# Patient Record
Sex: MALE | Race: ASIAN | NOT HISPANIC OR LATINO | Employment: FULL TIME | URBAN - METROPOLITAN AREA
[De-identification: names, ages, dates, MRNs, and addresses within clinical notes are randomized per-mention and may not be internally consistent; named-entity substitution may affect disease eponyms.]

---

## 2024-02-06 ENCOUNTER — HOSPITAL ENCOUNTER (INPATIENT)
Facility: CLINIC | Age: 54
LOS: 3 days | Discharge: HOME | DRG: 976 | End: 2024-02-09
Attending: EMERGENCY MEDICINE | Admitting: INTERNAL MEDICINE

## 2024-02-06 ENCOUNTER — HOSPITAL ENCOUNTER (INPATIENT)
Facility: CLINIC | Age: 54
LOS: 1 days | Discharge: HOME | DRG: 866 | End: 2024-02-06
Attending: EMERGENCY MEDICINE | Admitting: EMERGENCY MEDICINE

## 2024-02-06 VITALS
OXYGEN SATURATION: 100 % | HEIGHT: 65 IN | RESPIRATION RATE: 18 BRPM | TEMPERATURE: 98.4 F | WEIGHT: 130 LBS | SYSTOLIC BLOOD PRESSURE: 139 MMHG | DIASTOLIC BLOOD PRESSURE: 85 MMHG | BODY MASS INDEX: 21.66 KG/M2 | HEART RATE: 97 BPM

## 2024-02-06 DIAGNOSIS — B02.7 DISSEMINATED HERPES ZOSTER: ICD-10-CM

## 2024-02-06 DIAGNOSIS — B02.7 DISSEMINATED ZOSTER: Primary | ICD-10-CM

## 2024-02-06 DIAGNOSIS — B02.7 DISSEMINATED ZOSTER: ICD-10-CM

## 2024-02-06 LAB
ANION GAP SERPL CALCULATED.3IONS-SCNC: 11 MMOL/L (ref 7–15)
BASOPHILS # BLD AUTO: 0 10E3/UL (ref 0–0.2)
BASOPHILS NFR BLD AUTO: 0 %
BUN SERPL-MCNC: 16.8 MG/DL (ref 6–20)
CALCIUM SERPL-MCNC: 9 MG/DL (ref 8.6–10)
CHLORIDE SERPL-SCNC: 105 MMOL/L (ref 98–107)
CREAT SERPL-MCNC: 1.07 MG/DL (ref 0.67–1.17)
DEPRECATED HCO3 PLAS-SCNC: 23 MMOL/L (ref 22–29)
EGFRCR SERPLBLD CKD-EPI 2021: 84 ML/MIN/1.73M2
EOSINOPHIL # BLD AUTO: 0.1 10E3/UL (ref 0–0.7)
EOSINOPHIL NFR BLD AUTO: 1 %
ERYTHROCYTE [DISTWIDTH] IN BLOOD BY AUTOMATED COUNT: 13.8 % (ref 10–15)
GLUCOSE SERPL-MCNC: 98 MG/DL (ref 70–99)
HCT VFR BLD AUTO: 39.5 % (ref 40–53)
HGB BLD-MCNC: 12.9 G/DL (ref 13.3–17.7)
IMM GRANULOCYTES # BLD: 0 10E3/UL
IMM GRANULOCYTES NFR BLD: 0 %
LYMPHOCYTES # BLD AUTO: 2.3 10E3/UL (ref 0.8–5.3)
LYMPHOCYTES NFR BLD AUTO: 47 %
MCH RBC QN AUTO: 28.9 PG (ref 26.5–33)
MCHC RBC AUTO-ENTMCNC: 32.7 G/DL (ref 31.5–36.5)
MCV RBC AUTO: 89 FL (ref 78–100)
MONOCYTES # BLD AUTO: 0.6 10E3/UL (ref 0–1.3)
MONOCYTES NFR BLD AUTO: 12 %
NEUTROPHILS # BLD AUTO: 2 10E3/UL (ref 1.6–8.3)
NEUTROPHILS NFR BLD AUTO: 40 %
NRBC # BLD AUTO: 0 10E3/UL
NRBC BLD AUTO-RTO: 0 /100
PLATELET # BLD AUTO: 232 10E3/UL (ref 150–450)
POTASSIUM SERPL-SCNC: 4.6 MMOL/L (ref 3.4–5.3)
RBC # BLD AUTO: 4.46 10E6/UL (ref 4.4–5.9)
SODIUM SERPL-SCNC: 139 MMOL/L (ref 135–145)
WBC # BLD AUTO: 5 10E3/UL (ref 4–11)

## 2024-02-06 PROCEDURE — 99285 EMERGENCY DEPT VISIT HI MDM: CPT | Mod: 25

## 2024-02-06 PROCEDURE — 250N000011 HC RX IP 250 OP 636: Mod: JZ | Performed by: EMERGENCY MEDICINE

## 2024-02-06 PROCEDURE — 99222 1ST HOSP IP/OBS MODERATE 55: CPT | Mod: AI | Performed by: INTERNAL MEDICINE

## 2024-02-06 PROCEDURE — 80048 BASIC METABOLIC PNL TOTAL CA: CPT | Performed by: EMERGENCY MEDICINE

## 2024-02-06 PROCEDURE — 250N000011 HC RX IP 250 OP 636: Performed by: INTERNAL MEDICINE

## 2024-02-06 PROCEDURE — 120N000001 HC R&B MED SURG/OB

## 2024-02-06 PROCEDURE — 258N000003 HC RX IP 258 OP 636: Performed by: EMERGENCY MEDICINE

## 2024-02-06 PROCEDURE — 36415 COLL VENOUS BLD VENIPUNCTURE: CPT | Performed by: EMERGENCY MEDICINE

## 2024-02-06 PROCEDURE — 87536 HIV-1 QUANT&REVRSE TRNSCRPJ: CPT | Performed by: EMERGENCY MEDICINE

## 2024-02-06 PROCEDURE — 250N000013 HC RX MED GY IP 250 OP 250 PS 637: Performed by: INTERNAL MEDICINE

## 2024-02-06 PROCEDURE — 258N000003 HC RX IP 258 OP 636: Performed by: INTERNAL MEDICINE

## 2024-02-06 PROCEDURE — 85025 COMPLETE CBC W/AUTO DIFF WBC: CPT | Performed by: EMERGENCY MEDICINE

## 2024-02-06 RX ORDER — ONDANSETRON 2 MG/ML
4 INJECTION INTRAMUSCULAR; INTRAVENOUS EVERY 6 HOURS PRN
Status: DISCONTINUED | OUTPATIENT
Start: 2024-02-06 | End: 2024-02-09 | Stop reason: HOSPADM

## 2024-02-06 RX ORDER — LIDOCAINE 40 MG/G
CREAM TOPICAL
Status: DISCONTINUED | OUTPATIENT
Start: 2024-02-06 | End: 2024-02-09 | Stop reason: HOSPADM

## 2024-02-06 RX ORDER — BISACODYL 10 MG
10 SUPPOSITORY, RECTAL RECTAL DAILY PRN
Status: DISCONTINUED | OUTPATIENT
Start: 2024-02-06 | End: 2024-02-09 | Stop reason: HOSPADM

## 2024-02-06 RX ORDER — GABAPENTIN 100 MG/1
300 CAPSULE ORAL ONCE
Status: DISCONTINUED | OUTPATIENT
Start: 2024-02-06 | End: 2024-02-06

## 2024-02-06 RX ORDER — POLYETHYLENE GLYCOL 3350 17 G/17G
17 POWDER, FOR SOLUTION ORAL 2 TIMES DAILY PRN
Status: DISCONTINUED | OUTPATIENT
Start: 2024-02-06 | End: 2024-02-09 | Stop reason: HOSPADM

## 2024-02-06 RX ORDER — GABAPENTIN 100 MG/1
100 CAPSULE ORAL 3 TIMES DAILY
Status: DISCONTINUED | OUTPATIENT
Start: 2024-02-06 | End: 2024-02-09 | Stop reason: HOSPADM

## 2024-02-06 RX ORDER — OXYCODONE HYDROCHLORIDE 5 MG/1
5 TABLET ORAL
Status: DISCONTINUED | OUTPATIENT
Start: 2024-02-06 | End: 2024-02-06

## 2024-02-06 RX ORDER — ACETAMINOPHEN 325 MG/1
975 TABLET ORAL EVERY 8 HOURS
Status: DISCONTINUED | OUTPATIENT
Start: 2024-02-06 | End: 2024-02-09 | Stop reason: HOSPADM

## 2024-02-06 RX ORDER — ONDANSETRON 4 MG/1
4 TABLET, ORALLY DISINTEGRATING ORAL EVERY 6 HOURS PRN
Status: DISCONTINUED | OUTPATIENT
Start: 2024-02-06 | End: 2024-02-09 | Stop reason: HOSPADM

## 2024-02-06 RX ORDER — HYDROMORPHONE HCL IN WATER/PF 6 MG/30 ML
0.4 PATIENT CONTROLLED ANALGESIA SYRINGE INTRAVENOUS
Status: DISCONTINUED | OUTPATIENT
Start: 2024-02-06 | End: 2024-02-09 | Stop reason: HOSPADM

## 2024-02-06 RX ORDER — HYDROMORPHONE HYDROCHLORIDE 2 MG/1
2 TABLET ORAL EVERY 4 HOURS PRN
Status: DISCONTINUED | OUTPATIENT
Start: 2024-02-06 | End: 2024-02-09 | Stop reason: HOSPADM

## 2024-02-06 RX ORDER — SODIUM CHLORIDE 9 MG/ML
INJECTION, SOLUTION INTRAVENOUS CONTINUOUS
Status: DISCONTINUED | OUTPATIENT
Start: 2024-02-06 | End: 2024-02-09

## 2024-02-06 RX ORDER — HYDROMORPHONE HCL IN WATER/PF 6 MG/30 ML
0.2 PATIENT CONTROLLED ANALGESIA SYRINGE INTRAVENOUS
Status: DISCONTINUED | OUTPATIENT
Start: 2024-02-06 | End: 2024-02-09 | Stop reason: HOSPADM

## 2024-02-06 RX ORDER — AMOXICILLIN 250 MG
2 CAPSULE ORAL 2 TIMES DAILY PRN
Status: DISCONTINUED | OUTPATIENT
Start: 2024-02-06 | End: 2024-02-09 | Stop reason: HOSPADM

## 2024-02-06 RX ORDER — CALCIUM CARBONATE 500 MG/1
1000 TABLET, CHEWABLE ORAL 4 TIMES DAILY PRN
Status: DISCONTINUED | OUTPATIENT
Start: 2024-02-06 | End: 2024-02-09 | Stop reason: HOSPADM

## 2024-02-06 RX ORDER — AMOXICILLIN 250 MG
1 CAPSULE ORAL 2 TIMES DAILY PRN
Status: DISCONTINUED | OUTPATIENT
Start: 2024-02-06 | End: 2024-02-09 | Stop reason: HOSPADM

## 2024-02-06 RX ORDER — ACETAMINOPHEN 500 MG
1000 TABLET ORAL ONCE
Status: DISCONTINUED | OUTPATIENT
Start: 2024-02-06 | End: 2024-02-06

## 2024-02-06 RX ADMIN — ACYCLOVIR SODIUM 600 MG: 50 INJECTION, SOLUTION INTRAVENOUS at 08:43

## 2024-02-06 RX ADMIN — GABAPENTIN 100 MG: 100 CAPSULE ORAL at 21:03

## 2024-02-06 RX ADMIN — ACYCLOVIR SODIUM 600 MG: 50 INJECTION, SOLUTION INTRAVENOUS at 22:56

## 2024-02-06 RX ADMIN — ACETAMINOPHEN 975 MG: 325 TABLET, FILM COATED ORAL at 21:03

## 2024-02-06 RX ADMIN — SODIUM CHLORIDE: 9 INJECTION, SOLUTION INTRAVENOUS at 22:55

## 2024-02-06 ASSESSMENT — ACTIVITIES OF DAILY LIVING (ADL)
ADLS_ACUITY_SCORE: 18
ADLS_ACUITY_SCORE: 18
ADLS_ACUITY_SCORE: 35
ADLS_ACUITY_SCORE: 35

## 2024-02-06 NOTE — ED TRIAGE NOTES
Pt was seen earlier and was told to stay overnight due to shingles. Pt declined and stated he needed to bring roommate to work. Pt states he would be back and now wants to be admitted. Pt complains of rash to neck.

## 2024-02-06 NOTE — DISCHARGE INSTRUCTIONS
"Once you have returned your car to your roommate, please have your roommate bring you back here immediately.  The findings of \"disseminated shingles\" are serious and need to be treated in the hospital with IV antiviral medicines.  "

## 2024-02-06 NOTE — ED PROVIDER NOTES
"  History     Chief Complaint:  Rash     The history is provided by the patient.      Sandi Thomas is a 53 year old male who presents with a rash. Patient reports onset of a raised, blistering rash around the right side of his neck 2 days ago. Since then, it has spread around the right side of his neck, ear, collarbone, and scalp. He states this is mildly itchy, and the blisters are draining fluid. He adds he has some rash on his forehead near his scalp, but this appears different than the other areas. Denies fever or vomiting. Also denies pain, other than some discomfort moving his neck as his skin stretches. Patient also states he got a new necklace about one week ago, which was a little itchy, but denies known metal allergies. Patient reports history of HIV, but has had viral testing with infectious disease, and is not on any medication for this. Denies use of daily medication.     Independent Historian:    None - Patient Only    Review of External Notes:  Telephone encounter 9/29/2023: Social work was trying to get the patient scheduled in infectious disease clinic.  It looks like last medications were refilled in October 2021.    Allergies:  No Known Allergies     Physical Exam   Patient Vitals for the past 24 hrs:   BP Temp Temp src Pulse Resp SpO2 Height Weight   02/06/24 0707 139/85 98.4  F (36.9  C) Oral 97 18 100 % 1.651 m (5' 5\") 59 kg (130 lb)        Physical Exam    Skin: There are notable vesicles (some with scabbing) over an erythematous base extending at least from the C4 down to the C7 dermatome (possibly up into the C3 and down to T1).  These seem to be localized to the right side of his body. No facial lesions noted.  Ears: No ear lesions noted.  Eye: No facial lesions noted. No eye pain or suggestion of ocular lesions.    Emergency Department Course     Laboratory: Imaging:   Labs Ordered and Resulted from Time of ED Arrival to Time of ED Departure   CBC WITH PLATELETS AND DIFFERENTIAL - Abnormal "       Result Value    WBC Count 5.0      RBC Count 4.46      Hemoglobin 12.9 (*)     Hematocrit 39.5 (*)     MCV 89      MCH 28.9      MCHC 32.7      RDW 13.8      Platelet Count 232      % Neutrophils 40      % Lymphocytes 47      % Monocytes 12      % Eosinophils 1      % Basophils 0      % Immature Granulocytes 0      NRBCs per 100 WBC 0      Absolute Neutrophils 2.0      Absolute Lymphocytes 2.3      Absolute Monocytes 0.6      Absolute Eosinophils 0.1      Absolute Basophils 0.0      Absolute Immature Granulocytes 0.0      Absolute NRBCs 0.0     BASIC METABOLIC PANEL - Normal    Sodium 139      Potassium 4.6      Chloride 105      Carbon Dioxide (CO2) 23      Anion Gap 11      Urea Nitrogen 16.8      Creatinine 1.07      GFR Estimate 84      Calcium 9.0      Glucose 98     HIV-1 RNA QUANTITATIVE     No orders to display           Emergency Department Course & Assessments:  Interventions:  Medications   acyclovir (ZOVIRAX) 600 mg in sodium chloride 0.9 % 112 mL intermittent infusion (0 mg Intravenous Stopped 2/6/24 1014)        Assessments, Independent Interpretation, Consult/Discussion of ManagementTests:  ED Course as of 02/06/24 1157   Tue Feb 06, 2024   0740 I evaluated the patient and obtained history as noted above.   0746 I obtained additional history from the patient.    0813 Consult with Dr. Moscoso, Infectious Disease She confirms that IV acyclovir and admission is reasonable. Would like HIV RNA (she will order other tests)   0815 Rechecked and updated patient with plan for admission.   0829 Consult with NAYANA Wynne for Dr. Greer, hospitalist. She accepted the patient for admission.    0958 Nurse informed me patient thinks he is being discharged to home and is getting upset with her. I spoke with him to reiterate plan for admission.   1157 I telephoned the patient to follow-up with him and he notes that he will be coming back in in a couple of hours.     Social Determinants of Health affecting  care:  None    Disposition:  See ED Course and MDM    Impression & Plan    CMS Diagnoses: None    Code Status: No Order    Medical Decision Making:  This 53-year-old male patient presents to the ED due to a rash.  Please see the HPI and exam for specifics.  The rash is consistent with shingles and unfortunately appears to be disseminated shingles as this covers multiple dermatomes as above in the exam.  It does not cross midline.  The patient has a history of HIV but states he is not currently on any medication.  After discussion with infectious disease, admission for IV acyclovir was recommended.  The patient was initially agreeable to this though later he told nursing he needed to leave.  The patient tells me that he drove his roommates car here and has remained needs to go to work.  I told him that I could discharge him but asked his roommate to bring him back and the patient was agreeable to this.  He will return and I told him that hospitalization will be needed for this condition.    Critical Care:  None.    Diagnosis:    ICD-10-CM    1. Disseminated zoster  B02.7 Primary Care Referral         Scribe Disclosure:  Lakeshia LOU, am serving as a scribe at 7:38 AM on 2/6/2024 to document services personally performed by Sebastien Laguerre DO based on my observations and the provider's statements to me.    2/6/2024   Sebastien Laguerre DO Burns, Bradley Joseph, DO  02/06/24 1150       Sebastien Laguerre DO  02/06/24 1158

## 2024-02-06 NOTE — ED NOTES
"[facility]  ED Nurse Handoff Report    ED Chief complaint: Rash  . ED Diagnosis:   Final diagnoses:   Disseminated zoster       Allergies: No Known Allergies    Code Status: Full Code    Activity level - Baseline/Home:  independent.  Activity Level - Current:   independent.   Lift room needed: No.   Bariatric: No   Needed: No   Isolation: Yes. Suspected shingles, disseminated.   Infection: Not Applicable  Other .     Respiratory status: Room air    Vital Signs (within 30 minutes):   Vitals:    02/06/24 0707   BP: 139/85   Pulse: 97   Resp: 18   Temp: 98.4  F (36.9  C)   TempSrc: Oral   SpO2: 100%   Weight: 59 kg (130 lb)   Height: 1.651 m (5' 5\")       Cardiac Rhythm:  ,      Pain level:    Patient confused: No.   Patient Falls Risk: patient and family education.   Elimination Status: Has voided     Patient Report - Initial Complaint: rash.   Focused Assessment: Sandi Thomas is a 53 year old male who presents with a rash. Patient reports onset of a raised, blistering rash around the right side of his neck 2 days ago. Since then, it has spread around the right side of his neck, ear, collarbone, and scalp. He states this is mildly itchy, and the blisters are draining fluid. He adds he has some rash on his forehead near his scalp, but this appears different than the other areas. Denies fever or vomiting. Also denies pain, other than some discomfort moving his neck as his skin stretches. Patient also states he got a new necklace about one week ago, which was a little itchy, but denies known metal allergies. Patient reports history of HIV, but has had viral testing with infectious disease, and is not on any medication for this. Denies use of daily medication.              Time Type Event User           0856  Skin  JJ    SkinSkin WDL: .WDL except (pustule open rash posterior neck, lateral R neck; denies fevers. Blanchable)              Abnormal Results:   Labs Ordered and Resulted from " Time of ED Arrival to Time of ED Departure   CBC WITH PLATELETS AND DIFFERENTIAL - Abnormal       Result Value    WBC Count 5.0      RBC Count 4.46      Hemoglobin 12.9 (*)     Hematocrit 39.5 (*)     MCV 89      MCH 28.9      MCHC 32.7      RDW 13.8      Platelet Count 232      % Neutrophils 40      % Lymphocytes 47      % Monocytes 12      % Eosinophils 1      % Basophils 0      % Immature Granulocytes 0      NRBCs per 100 WBC 0      Absolute Neutrophils 2.0      Absolute Lymphocytes 2.3      Absolute Monocytes 0.6      Absolute Eosinophils 0.1      Absolute Basophils 0.0      Absolute Immature Granulocytes 0.0      Absolute NRBCs 0.0     BASIC METABOLIC PANEL   HIV-1 RNA QUANTITATIVE        No orders to display       Treatments provided: labs  Family Comments: per pt.   OBS brochure/video discussed/provided to patient:  No  ED Medications:   Medications   acyclovir (ZOVIRAX) 600 mg in sodium chloride 0.9 % 112 mL intermittent infusion (600 mg Intravenous $New Bag 2/6/24 0895)       Drips infusing:  No  For the majority of the shift this patient was Green.   Interventions performed were n/a.    Sepsis treatment initiated: No    Cares/treatment/interventions/medications to be completed following ED care: none    ED Nurse Name: Rosamaria Georges RN  8:58 AM

## 2024-02-06 NOTE — ED TRIAGE NOTES
Pt here today for a rash that started on his neck and is now spreading to the back of his head and shoulder. Rash first appeared 2 days ago. Pt states it is painful, rash is raised and appears to be blistering. No known exposure that pt is aware of. Denies any other symptoms or hx chicken pox

## 2024-02-07 LAB
ALBUMIN SERPL BCG-MCNC: 3.8 G/DL (ref 3.5–5.2)
ALP SERPL-CCNC: 55 U/L (ref 40–150)
ALT SERPL W P-5'-P-CCNC: 20 U/L (ref 0–70)
ANION GAP SERPL CALCULATED.3IONS-SCNC: 7 MMOL/L (ref 7–15)
AST SERPL W P-5'-P-CCNC: 31 U/L (ref 0–45)
BASOPHILS # BLD AUTO: 0 10E3/UL (ref 0–0.2)
BASOPHILS NFR BLD AUTO: 0 %
BILIRUB SERPL-MCNC: 0.3 MG/DL
BUN SERPL-MCNC: 13.7 MG/DL (ref 6–20)
CALCIUM SERPL-MCNC: 8.8 MG/DL (ref 8.6–10)
CD3 CELLS # BLD: 1908 CELLS/UL (ref 603–2990)
CD3 CELLS NFR BLD: 91 % (ref 49–84)
CD3+CD4+ CELLS # BLD: 87 CELLS/UL (ref 441–2156)
CD3+CD4+ CELLS NFR BLD: 4 % (ref 28–63)
CD3+CD4+ CELLS/CD3+CD8+ CLL BLD: 0.05 % (ref 1.4–2.6)
CD3+CD8+ CELLS # BLD: 1798 CELLS/UL (ref 125–1312)
CD3+CD8+ CELLS NFR BLD: 85 % (ref 10–40)
CHLORIDE SERPL-SCNC: 104 MMOL/L (ref 98–107)
CREAT SERPL-MCNC: 1.06 MG/DL (ref 0.67–1.17)
DEPRECATED HCO3 PLAS-SCNC: 25 MMOL/L (ref 22–29)
EGFRCR SERPLBLD CKD-EPI 2021: 85 ML/MIN/1.73M2
EOSINOPHIL # BLD AUTO: 0.1 10E3/UL (ref 0–0.7)
EOSINOPHIL NFR BLD AUTO: 2 %
ERYTHROCYTE [DISTWIDTH] IN BLOOD BY AUTOMATED COUNT: 13.9 % (ref 10–15)
GLUCOSE SERPL-MCNC: 83 MG/DL (ref 70–99)
HCT VFR BLD AUTO: 39.5 % (ref 40–53)
HGB BLD-MCNC: 12.8 G/DL (ref 13.3–17.7)
IMM GRANULOCYTES # BLD: 0 10E3/UL
IMM GRANULOCYTES NFR BLD: 0 %
LYMPHOCYTES # BLD AUTO: 1.9 10E3/UL (ref 0.8–5.3)
LYMPHOCYTES NFR BLD AUTO: 37 %
MCH RBC QN AUTO: 29.4 PG (ref 26.5–33)
MCHC RBC AUTO-ENTMCNC: 32.4 G/DL (ref 31.5–36.5)
MCV RBC AUTO: 91 FL (ref 78–100)
MONOCYTES # BLD AUTO: 0.6 10E3/UL (ref 0–1.3)
MONOCYTES NFR BLD AUTO: 11 %
NEUTROPHILS # BLD AUTO: 2.6 10E3/UL (ref 1.6–8.3)
NEUTROPHILS NFR BLD AUTO: 50 %
NRBC # BLD AUTO: 0 10E3/UL
NRBC BLD AUTO-RTO: 0 /100
PLATELET # BLD AUTO: 205 10E3/UL (ref 150–450)
POTASSIUM SERPL-SCNC: 4 MMOL/L (ref 3.4–5.3)
PROT SERPL-MCNC: 7.6 G/DL (ref 6.4–8.3)
RBC # BLD AUTO: 4.36 10E6/UL (ref 4.4–5.9)
SODIUM SERPL-SCNC: 136 MMOL/L (ref 135–145)
T CELL COMMENT: ABNORMAL
WBC # BLD AUTO: 5.2 10E3/UL (ref 4–11)

## 2024-02-07 PROCEDURE — 86360 T CELL ABSOLUTE COUNT/RATIO: CPT | Performed by: INTERNAL MEDICINE

## 2024-02-07 PROCEDURE — 36415 COLL VENOUS BLD VENIPUNCTURE: CPT | Performed by: INTERNAL MEDICINE

## 2024-02-07 PROCEDURE — 99254 IP/OBS CNSLTJ NEW/EST MOD 60: CPT | Performed by: INTERNAL MEDICINE

## 2024-02-07 PROCEDURE — 120N000001 HC R&B MED SURG/OB

## 2024-02-07 PROCEDURE — 250N000011 HC RX IP 250 OP 636: Mod: JZ | Performed by: INTERNAL MEDICINE

## 2024-02-07 PROCEDURE — 86359 T CELLS TOTAL COUNT: CPT | Performed by: INTERNAL MEDICINE

## 2024-02-07 PROCEDURE — 85004 AUTOMATED DIFF WBC COUNT: CPT | Performed by: INTERNAL MEDICINE

## 2024-02-07 PROCEDURE — 250N000013 HC RX MED GY IP 250 OP 250 PS 637: Performed by: INTERNAL MEDICINE

## 2024-02-07 PROCEDURE — 258N000003 HC RX IP 258 OP 636: Performed by: INTERNAL MEDICINE

## 2024-02-07 PROCEDURE — 80053 COMPREHEN METABOLIC PANEL: CPT | Performed by: INTERNAL MEDICINE

## 2024-02-07 PROCEDURE — 99232 SBSQ HOSP IP/OBS MODERATE 35: CPT | Performed by: INTERNAL MEDICINE

## 2024-02-07 RX ADMIN — ACETAMINOPHEN 975 MG: 325 TABLET, FILM COATED ORAL at 19:56

## 2024-02-07 RX ADMIN — ACYCLOVIR SODIUM 600 MG: 50 INJECTION, SOLUTION INTRAVENOUS at 14:31

## 2024-02-07 RX ADMIN — SODIUM CHLORIDE: 9 INJECTION, SOLUTION INTRAVENOUS at 11:17

## 2024-02-07 RX ADMIN — GABAPENTIN 100 MG: 100 CAPSULE ORAL at 09:00

## 2024-02-07 RX ADMIN — ACYCLOVIR SODIUM 600 MG: 50 INJECTION, SOLUTION INTRAVENOUS at 22:17

## 2024-02-07 RX ADMIN — ACETAMINOPHEN 975 MG: 325 TABLET, FILM COATED ORAL at 11:25

## 2024-02-07 RX ADMIN — ACYCLOVIR SODIUM 600 MG: 50 INJECTION, SOLUTION INTRAVENOUS at 06:50

## 2024-02-07 RX ADMIN — ACETAMINOPHEN 975 MG: 325 TABLET, FILM COATED ORAL at 03:16

## 2024-02-07 RX ADMIN — GABAPENTIN 100 MG: 100 CAPSULE ORAL at 19:57

## 2024-02-07 RX ADMIN — GABAPENTIN 100 MG: 100 CAPSULE ORAL at 14:31

## 2024-02-07 RX ADMIN — SODIUM CHLORIDE: 9 INJECTION, SOLUTION INTRAVENOUS at 22:17

## 2024-02-07 RX ADMIN — BICTEGRAVIR SODIUM, EMTRICITABINE, AND TENOFOVIR ALAFENAMIDE FUMARATE 1 TABLET: 50; 200; 25 TABLET ORAL at 16:29

## 2024-02-07 ASSESSMENT — ACTIVITIES OF DAILY LIVING (ADL)
ADLS_ACUITY_SCORE: 18

## 2024-02-07 NOTE — PLAN OF CARE
ROOM # 625    Living Situation: Home w/ friend, [facility]  Facility name:  : [facility]    Activity level at baseline: ind  Activity level on admit: ind    Who will be transporting you at discharge: [facility]    Patient registered to observation; given Patient Bill of Rights; given the opportunity to ask questions about observation status and their plan of care.  Patient has been oriented to the observation room, bathroom and call light is in place.    Discussed discharge goals and expectations with patient/family.         Goal Outcome Evaluation:

## 2024-02-07 NOTE — PHARMACY-ADMISSION MEDICATION HISTORY
Pharmacy Intern Admission Medication History    Admission medication history is complete. The information provided in this note is only as accurate as the sources available at the time of the update.    Information Source(s): Patient via phone    Pertinent Information: NA    Changes made to PTA medication list:  Added: None  Deleted: None  Changed: None    Allergies reviewed with patient and updates made in EHR: yes    Medication History Completed By: Stacy Merrill 2/7/2024 11:39 AM    Prior to Admission medications    Not on File

## 2024-02-07 NOTE — CONSULTS
[facility]    Infectious Disease Consultation     Date of Admission:  2/6/2024  Date of Consult (When I saw the patient): 02/07/24    Assessment & Plan   Sandi Thomas is a 53 year old who was admitted on 2/6/2024.     Impression: 1 53-year-old male, underlying HIV, admitted with acute right chest wall and arm zoster, primarily in dermatome, no evidence of visceral infection some dissemination outside the border  2 longstanding HIV infection, last lab studies in 2018 looks like stopped medications in 2021, had some insurance issues and no follow-up with ID at [facility] since that time, T cells 87 currently viral load pending but likely to be high    REC 1 start [employer], social service to see regarding the insurance issue and if has an insurance issue seek out support options for getting medications  2 acyclovir for now, likely oral therapy acceptable relatively soon        Jluis Hernández MD    Reason for Consult   Reason for consult: I was asked to evaluate this patient for zoster.    Primary Care Physician   Physician No Ref-Primary    Chief Complaint   Right chest wall pain    History is obtained from the patient and medical records    History of Present Illness   Sandi Thomas is a 53 year old male who presents with worsening right chest wall pain with some lesions outside the dermatome, consistent with zoster and now on treatment for that.  Patient has underlying HIV going back many years.  Followed primarily in the [facility] system.  From the records appears last labs were in 2018 at which time he is well-controlled on therapy.  He kept getting refills on the prescriptions and to about 2021 then had some insurance issues, has not followed up with particular had labs since then.  Has been off treatment at least the last 2 and half years and has felt relatively well and cul this current rash.  HIV labs already obtained and T cells just came back at 87    Past Medical History   I  "have reviewed this patient's medical history and updated it with pertinent information if needed.   Past Medical History:   Diagnosis Date    HIV (human immunodeficiency virus infection) (H)        Past Surgical History   I have reviewed this patient's surgical history and updated it with pertinent information if needed.  No past surgical history on file.    Prior to Admission Medications   None     Allergies   No Known Allergies    Immunization History     There is no immunization history on file for this patient.    Social History   I have reviewed this patient's social history and updated it with pertinent information if needed. Sandi Thomas  reports that he has been smoking. He does not have any smokeless tobacco history on file. He reports current alcohol use. He reports that he does not use drugs.    Family History   I have reviewed this patient's family history and updated it with pertinent information if needed.   No family history on file.    Review of Systems   The 10 point Review of Systems is negative other than the chest wall area, some pain but not severe    Physical Exam   Temp: 97.9  F (36.6  C) Temp src: Temporal BP: 137/82 Pulse: 83   Resp: 16 SpO2: 98 % O2 Device: None (Room air)    Vital Signs with Ranges  Temp:  [97.3  F (36.3  C)-99.2  F (37.3  C)] 97.9  F (36.6  C)  Pulse:  [82-95] 83  Resp:  [16-18] 16  BP: (121-142)/(61-91) 137/82  SpO2:  [98 %-100 %] 98 %  128 lbs 9.6 oz  Body mass index is 21.4 kg/m .    GENERAL APPEARANCE:  awake  EYES: Eyes grossly normal to inspection  NECK: no adenopathy  RESP: lungs clear   CV: regular rates and rhythm  LYMPHATICS: normal ant/post cervical and supraclavicular nodes  ABDOMEN: soft, nontender  MS: extremities normal  SKIN: no suspicious lesions right chest wall and arm mostly in dermatome consistent with varicella-zoster        Data   All laboratory and imaging data in the past 24 hours reviewed  No results for input(s): \"CULT\" in the last 168 " "hours.  No lab results found.    Invalid input(s): \"UC\"       All cultures:  No results for input(s): \"CULTURE\" in the last 168 hours.   Blood culture:  No results found for this or any previous visit.   Urine culture:  No results found for this or any previous visit.          "

## 2024-02-07 NOTE — ED PROVIDER NOTES
"  History     Chief Complaint:  Rash       HPI   Sandi Thomas is a 53 year old male who presents with rash. He explains that two days ago he developed a red blistered rash over the right side of his neck and collarbone that has since spread  downward into the right arm. He was seen in the ED earlier today and was started on Acyclovir though he had to leave to return a car (per ED note earlier today). He says that the associated pain is now a 9/10 in severity. Denies cough, fever, or history of shingles. He has not taken anything for pain. Patient has a medical history significant for HIV, he explains this was diagnosed roughly twenty years ago and has not required medication.    Independent Historian:   None - Patient Only    Medications:    No current outpatient medications on file.    Past Medical History:    HIV    Past Surgical History:    No past surgical history on file.     Physical Exam   Patient Vitals for the past 24 hrs:   BP Temp Temp src Pulse Resp SpO2 Height Weight   02/06/24 2044 (!) 140/91 97.6  F (36.4  C) Oral 82 18 100 % 1.651 m (5' 5\") 58.3 kg (128 lb 9.6 oz)   02/06/24 1751 (!) 142/89 99.2  F (37.3  C) Temporal 95 17 99 % -- --        Physical Exam  VS: Reviewed per above  HENT: normal speech  EYES: sclera anicteric  CV: Rate as noted  RESP: Effort normal.   NEURO: Alert, moving all extremities  MSK: No deformity of the extremities  SKIN: Warm and dry, vesicular lesions of the right neck and right posterior shoulder/upper back and right bicep region.        Emergency Department Course         Emergency Department Course & Assessments:       Interventions:  Medications   acyclovir (ZOVIRAX) 600 mg in sodium chloride 0.9 % 112 mL intermittent infusion (has no administration in time range)   lidocaine 1 % 0.1-1 mL (has no administration in time range)   lidocaine (LMX4) cream (has no administration in time range)   sodium chloride (PF) 0.9% PF flush 3 mL (has no administration in time range) "   sodium chloride (PF) 0.9% PF flush 3 mL (has no administration in time range)   senna-docusate (SENOKOT-S/PERICOLACE) 8.6-50 MG per tablet 1 tablet (has no administration in time range)     Or   senna-docusate (SENOKOT-S/PERICOLACE) 8.6-50 MG per tablet 2 tablet (has no administration in time range)   calcium carbonate (TUMS) chewable tablet 1,000 mg (has no administration in time range)   gabapentin (NEURONTIN) capsule 100 mg (100 mg Oral $Given 2/6/24 2103)   sodium chloride 0.9 % infusion (has no administration in time range)   HYDROmorphone (DILAUDID) half-tab 1 mg (has no administration in time range)   HYDROmorphone (DILAUDID) tablet 2 mg (has no administration in time range)   HYDROmorphone (DILAUDID) injection 0.2 mg (has no administration in time range)   HYDROmorphone (DILAUDID) injection 0.4 mg (has no administration in time range)   acetaminophen (TYLENOL) tablet 975 mg (975 mg Oral $Given 2/6/24 2103)   bisacodyl (DULCOLAX) suppository 10 mg (has no administration in time range)   polyethylene glycol (MIRALAX) Packet 17 g (has no administration in time range)   ondansetron (ZOFRAN ODT) ODT tab 4 mg (has no administration in time range)     Or   ondansetron (ZOFRAN) injection 4 mg (has no administration in time range)   nicotine (NICORETTE) gum 2 mg (has no administration in time range)        Assessments:  1844 I obtained history and examined the patient as noted above.    Disposition:  The patient was admitted to the hospital under the care of Dr. Morrison.     Impression & Plan      Medical Decision Making:  Patient presents to the ER after he was seen a few hours ago here with concern for disseminated herpes zoster infection in the setting of untreated HIV.  Patient was supposed to be admitted but left AMA for other reasons.  He returns with intent to be admitted.  Basic labs were checked a few hours ago and IV acyclovir was also provided.  Infectious disease was apparently also consulted.  At this  juncture, IV was replaced and he was admitted to hospitalist service for further cares.    Diagnosis:    ICD-10-CM    1. Disseminated herpes zoster  B02.7            Discharge Medications:  There are no discharge medications for this patient.       2/6/2024   Isaak Romero MD Lindenbaum, Elan, MD  02/06/24 6670

## 2024-02-07 NOTE — ED NOTES
[facility]  ED Nurse Handoff Report    ED Chief complaint: Rash  . ED Diagnosis:   Final diagnoses:   None       Allergies: No Known Allergies    Code Status: Full Code    Activity level - Baseline/Home:  independent.  Activity Level - Current:   independent.   Lift room needed: No.   Bariatric: No   Needed: No   Isolation: Yes.   Infection: Not Applicable  HIV.     Respiratory status: Room air    Vital Signs (within 30 minutes):   Vitals:    02/06/24 1751   BP: (!) 142/89   Pulse: 95   Resp: 17   Temp: 99.2  F (37.3  C)   TempSrc: Temporal   SpO2: 99%       Cardiac Rhythm:  ,      Pain level:    Patient confused: No.   Patient Falls Risk: arm band in place and patient and family education.   Elimination Status: Has voided     Patient Report - Initial Complaint: Rash.   Focused Assessment: Rash        HPI   Sandi Thomas is a 53 year old male who presents with rash. He explains that two days ago he developed a red blistered rash over the right side of his neck and collarbone that has since spread  downward into the right arm. He was seen in the ED earlier today and was started on Acyclovir though he had to leave to return a car (per ED note earlier today). He says that the associated pain is now a 9/10 in severity. Denies cough, fever, or history of shingles. He has not taken anything for pain. Patient has a medical history significant for HIV, he explains this was diagnosed roughly twenty years ago and has not required medication.     Abnormal Results:   Labs Ordered and Resulted from Time of ED Arrival to Time of ED Departure - No data to display     No orders to display       Treatments provided: None  Family Comments: N/a  OBS brochure/video discussed/provided to patient:  N/A  ED Medications: Medications - No data to display    Drips infusing:  No  For the majority of the shift this patient was Green.   Interventions performed were None.    Sepsis treatment initiated:  No    Cares/treatment/interventions/medications to be completed following ED care: None    ED Nurse Name: Yamileth Bah RN  6:56 PM   RECEIVING UNIT ED HANDOFF REVIEW    Above ED Nurse Handoff Report was reviewed: Yes  Reviewed by: Saad Denis RN on February 6, 2024 at 8:23 PM

## 2024-02-07 NOTE — PLAN OF CARE
Goal Outcome Evaluation:      Plan of Care Reviewed With: patient    Overall Patient Progress: improving    A&O x 4. Independent with mobility. Tolerating regular diet. IV fluids and IV antiviral infused.Tylenol and gabapentin for pain. VSS on RA. Rash to right shoulder, upper back, and neck. Discharge plan TBD.

## 2024-02-07 NOTE — PLAN OF CARE
Goal Outcome Evaluation:     Pt is A & O x 4, ind, rates pain 7-8, Tylenol & Gabapentin given with stated relief, contact precautions maintained, plan is ivf, Acyclovir q 8 hrs, ID consult.

## 2024-02-07 NOTE — PROGRESS NOTES
[facility]  Hospitalist Progress Note  Wiliam Hayward MD 02/07/24    Reason for Stay (Diagnosis): Disseminated zoster         Assessment and Plan:      Summary of Stay:     Sandi Thomas is a 53 year old male with past medical history including HIV diagnosed about 15 years ago for which it appears he has been lost to follow-up, not currently on any medications admitted on 2/6/2024 with an evolving blistering rash which started on his mid upper back a couple of days prior to admission which was painful and involved his neck, right shoulder/upper arm and scalp and forehead on the right.    Here basic lab workup was relatively unrevealing.  He was started on treatment with IV acyclovir for suspected disseminated zoster in the setting of untreated HIV.  Infectious disease has been consulted.  CD4 and viral load pending.     Probable disseminated herpes zoster.  Untreated HIV.  -Started acyclovir 10 mg/kg IV every 8 hours on 2/6  -gabapentin 100 mg 3 times a day.  -scheduled acetaminophen 975 mg 3 times a day.  -Infectious disease consult.  -Await viral load, CD4 count.  Depending on results may need to initiate antiviral therapy and/or OI prophylaxis.  -Does not currently follow with an outpatient ID physician.     Tobacco use disorder.  -Counseled regarding smoking cessation on 2/6/2024.  -Nicotine gum if needed.        Diet: Combination Diet Regular Diet Adult    DVT Prophylaxis: Pneumatic Compression Devices  Molina Catheter: Not present  Lines: None     Cardiac Monitoring: None  Code Status: Full Code    Disposition: Will largely depend on the length of IV therapy recommended by infectious disease.  Still has open/weeping vesicles    Clinically Significant Risk Factors Present on Admission                                        Interval History (Subjective):      Admitted last night, overall fairly comfortable.  Pain controlled  Still has numerous vesicles, on airborne and contact  "precautions                    Physical Exam:      Last Vital Signs:  /82 (BP Location: Left arm)   Pulse 83   Temp 97.9  F (36.6  C) (Temporal)   Resp 16   Ht 1.651 m (5' 5\")   Wt 58.3 kg (128 lb 9.6 oz)   SpO2 98%   BMI 21.40 kg/m      No intake or output data in the 24 hours ending 02/07/24 1310    General: Alert, awake, no acute distress.  HEENT: NC/AT, eyes anicteric, external occular movements intact, face symmetric.    Cardiac: RRR, S1, S2.  No murmurs appreciated.  Pulmonary: Normal chest rise, normal work of breathing.  Lungs CTA BL  Abdomen: soft, non-tender, non-distended.  Bowel Sounds Present.  No guarding.  Extremities: no deformities.  Warm, well perfused.  Skin: Prominent vesicular rash involving his posterior neck, scalp, forehead, right shoulder and upper chest.   Warm and Dry.  Neuro: No focal deficits noted.  Speech clear.  Coordination and strength grossly normal.  Psych: Appropriate affect.         Medications:      All current medications were reviewed with changes reflected in problem list.         Data:      All new lab and imaging data was reviewed.   Labs:  Recent Labs   Lab 02/07/24  0730      POTASSIUM 4.0   CHLORIDE 104   CO2 25   ANIONGAP 7   GLC 83   BUN 13.7   CR 1.06   GFRESTIMATED 85   HUGO 8.8     Recent Labs   Lab 02/07/24  0730   WBC 5.2   HGB 12.8*   HCT 39.5*   MCV 91        Recent Labs   Lab 02/07/24  0730   AST 31   ALT 20   ALKPHOS 55   BILITOTAL 0.3     No results for input(s): \"INR\" in the last 168 hours.   Imaging:   No results found for this or any previous visit (from the past 48 hour(s)).      Wiliam Hayward MD     I've spent 45 minutes in chart review, ordering medications and tests, obtaining additional history as needed, evaluating the patient and in documentation for this encounter.    "

## 2024-02-07 NOTE — H&P
[facility]    History and Physical - Hospitalist Service       Date of Admission:  2/6/2024    Assessment & Plan      Sandi Thomas is a 53 year old male admitted on 2/6/2024. He has a past medical history of previously diagnosed HIV.  He has not been on any medications for HIV.  No other known medical problems.  Developed a rash on his upper back 2 days ago.  Rash is spread to his right arm, posterior neck, scalp, and forehead.  Having pain in some of the areas where rash is present.  Symptoms seem to be getting worse.  Present to emergency room for further evaluation.  Diagnosed with probable disseminated herpes zoster.    Probable disseminated herpes zoster.  Untreated HIV.  -Start acyclovir 10 mg/kg IV every 8 hours.  -Start gabapentin 100 mg 3 times a day.  -Start scheduled acetaminophen 975 mg 3 times a day.  -Additional pain medications as needed.  -Infectious disease consult.    Tobacco use disorder.  -I did advise smoking cessation on 2/6/2024.  -Nicotine gum if needed.        Diet: Combination Diet Regular Diet Adult    DVT Prophylaxis: Pneumatic Compression Devices  Molina Catheter: Not present  Lines: None     Cardiac Monitoring: None  Code Status: Full Code      Clinically Significant Risk Factors Present on Admission                                  Disposition Plan      Expected Discharge Date: 02/08/2024                  Joe Morrison DO  Hospitalist Service  [facility]  Securely message with [employer] (more info)  Text page via [employer] Paging/Directory     ______________________________________________________________________    Chief Complaint   Rash.    History is obtained from the patient    History of Present Illness   Sandi Thomas is a 53 year old male who has a past medical history for untreated HIV.  Developed rash on his upper back 2 days ago.  Rash spread onto his right arm, posterior neck, chest, scalp, and forehead.  Having pain in some of  these areas.  Most of pain is his upper back and posterior neck area.  Had quite of itching with this rash.  Has not noticed fevers or chills.  No shortness of breath.  No chest pain.  No visual problems.  Has not had a rash like this previously.  No other acute complaints.  States he was diagnosed with HIV 20 years ago.  Has not been on any treatment for HIV previously.      Past Medical History    Past Medical History:   Diagnosis Date    HIV (human immunodeficiency virus infection) (H)        Past Surgical History   No past surgical history on file.    Prior to Admission Medications   None        Allergies   No Known Allergies     Physical Exam   Vital Signs: Temp: 99.2  F (37.3  C) Temp src: Temporal BP: (!) 142/89 Pulse: 95   Resp: 17 SpO2: 99 % O2 Device: None (Room air)    Weight: 0 lbs 0 oz    Gen:  NAD, A&Ox3.  Eyes:  PERRL, sclera anicteric.  OP:  MMM, no lesions.  Neck:  Supple.  CV:  Regular, no murmurs.  Lung:  CTA b/l, normal effort.  Ab:  +BS, soft.  Skin:  Warm, dry to touch.  No rash.  Ext:  No pitting edema LE b/l.      Medical Decision Making       60 MINUTES SPENT BY ME on the date of service doing chart review, history, exam, documentation & further activities per the note.      Data     I have personally reviewed the following data over the past 24 hrs:    5.0  \   12.9 (L)   / 232     139 105 16.8 /  98   4.6 23 1.07 \       Imaging results reviewed over the past 24 hrs:   No results found for this or any previous visit (from the past 24 hour(s)).

## 2024-02-08 LAB
HIV1 RNA # PLAS NAA DL=20: ABNORMAL COPIES/ML
HIV1 RNA SERPL NAA+PROBE-LOG#: 4.8 {LOG_COPIES}/ML

## 2024-02-08 PROCEDURE — 120N000001 HC R&B MED SURG/OB

## 2024-02-08 PROCEDURE — 250N000013 HC RX MED GY IP 250 OP 250 PS 637: Performed by: INTERNAL MEDICINE

## 2024-02-08 PROCEDURE — 99232 SBSQ HOSP IP/OBS MODERATE 35: CPT | Performed by: STUDENT IN AN ORGANIZED HEALTH CARE EDUCATION/TRAINING PROGRAM

## 2024-02-08 PROCEDURE — 250N000011 HC RX IP 250 OP 636: Performed by: INTERNAL MEDICINE

## 2024-02-08 PROCEDURE — 258N000003 HC RX IP 258 OP 636: Performed by: INTERNAL MEDICINE

## 2024-02-08 PROCEDURE — 99232 SBSQ HOSP IP/OBS MODERATE 35: CPT | Performed by: INTERNAL MEDICINE

## 2024-02-08 RX ORDER — NALOXONE HYDROCHLORIDE 0.4 MG/ML
0.2 INJECTION, SOLUTION INTRAMUSCULAR; INTRAVENOUS; SUBCUTANEOUS
Status: DISCONTINUED | OUTPATIENT
Start: 2024-02-08 | End: 2024-02-09 | Stop reason: HOSPADM

## 2024-02-08 RX ORDER — NALOXONE HYDROCHLORIDE 0.4 MG/ML
0.4 INJECTION, SOLUTION INTRAMUSCULAR; INTRAVENOUS; SUBCUTANEOUS
Status: DISCONTINUED | OUTPATIENT
Start: 2024-02-08 | End: 2024-02-09 | Stop reason: HOSPADM

## 2024-02-08 RX ADMIN — GABAPENTIN 100 MG: 100 CAPSULE ORAL at 21:11

## 2024-02-08 RX ADMIN — GABAPENTIN 100 MG: 100 CAPSULE ORAL at 14:04

## 2024-02-08 RX ADMIN — SODIUM CHLORIDE: 9 INJECTION, SOLUTION INTRAVENOUS at 08:33

## 2024-02-08 RX ADMIN — ACYCLOVIR SODIUM 600 MG: 50 INJECTION, SOLUTION INTRAVENOUS at 16:29

## 2024-02-08 RX ADMIN — SODIUM CHLORIDE: 9 INJECTION, SOLUTION INTRAVENOUS at 21:18

## 2024-02-08 RX ADMIN — BICTEGRAVIR SODIUM, EMTRICITABINE, AND TENOFOVIR ALAFENAMIDE FUMARATE 1 TABLET: 50; 200; 25 TABLET ORAL at 08:32

## 2024-02-08 RX ADMIN — GABAPENTIN 100 MG: 100 CAPSULE ORAL at 08:32

## 2024-02-08 RX ADMIN — ACETAMINOPHEN 975 MG: 325 TABLET, FILM COATED ORAL at 04:12

## 2024-02-08 RX ADMIN — ACETAMINOPHEN 975 MG: 325 TABLET, FILM COATED ORAL at 11:29

## 2024-02-08 RX ADMIN — ACYCLOVIR SODIUM 600 MG: 50 INJECTION, SOLUTION INTRAVENOUS at 08:33

## 2024-02-08 RX ADMIN — ACETAMINOPHEN 975 MG: 325 TABLET, FILM COATED ORAL at 21:10

## 2024-02-08 ASSESSMENT — ACTIVITIES OF DAILY LIVING (ADL)
ADLS_ACUITY_SCORE: 18
DEPENDENT_IADLS:: INDEPENDENT
ADLS_ACUITY_SCORE: 18

## 2024-02-08 NOTE — PROGRESS NOTES
"[facility]  Infectious Disease Progress Note          Assessment and Plan:   Date of Admission:  2/6/2024  Date of Consult (When I saw the patient): 02/07/24        Assessment & Plan  Sandi Thomas is a 53 year old who was admitted on 2/6/2024.      Impression: 1 53-year-old male, underlying HIV, admitted with acute right chest wall and arm zoster, primarily in dermatome, no evidence of visceral infection some dissemination outside the border  2 longstanding HIV infection, last lab studies in 2018 looks like stopped medications in 2021, had some insurance issues and no follow-up with ID at [facility] since that time, T cells 87 currently viral load pending but likely to be high     REC 1 start Biktarvy, social service to see regarding the insurance issue and if has an insurance issue seek out support options for getting medications their note seen, may have insurance awaiting for that information if not insurance hopefully some emergency plan to get his meds  2 acyclovir for now, likely oral therapy acceptable relatively soon but 1 more day IV hopefully figuring out plan to get Biktarvy covered or at least a plan to make that happen soon                 Interval History:     no new complaints and doing well; no cp, sob, n/v/d, or abd pain.  Feels okay, tolerating meds, rash about the same              Medications:      acetaminophen  975 mg Oral Q8H    acyclovir  10 mg/kg Intravenous Q8H    bictegravir-emtricitabine-tenofovir  1 tablet Oral Daily    gabapentin  100 mg Oral TID    sodium chloride (PF)  3 mL Intracatheter Q8H                  Physical Exam:   Blood pressure 111/75, pulse 113, temperature 98.4  F (36.9  C), temperature source Temporal, resp. rate 17, height 1.651 m (5' 5\"), weight 58.3 kg (128 lb 9.6 oz), SpO2 97%.  Wt Readings from Last 2 Encounters:   02/06/24 58.3 kg (128 lb 9.6 oz)   02/06/24 59 kg (130 lb)     Vital Signs with Ranges  Temp:  [98.1  F (36.7  C)-98.4  F " "(36.9  C)] 98.4  F (36.9  C)  Pulse:  [] 113  Resp:  [16-17] 17  BP: (111-117)/(64-75) 111/75  SpO2:  [97 %-98 %] 97 %    Constitutional: Awake, alert, cooperative, no apparent distress     Lungs: Clear to auscultation bilaterally, no crackles or wheezing   Cardiovascular: Regular rate and rhythm, normal S1 and S2, and no murmur noted   Abdomen: Normal bowel sounds, soft, non-distended, non-tender   Skin: No rashes, no cyanosis, no edema   Other: Rash about same          Data:   All microbiology laboratory data reviewed.  Recent Labs   Lab Test 02/07/24  0730 02/06/24  0839   WBC 5.2 5.0   HGB 12.8* 12.9*   HCT 39.5* 39.5*   MCV 91 89    232     Recent Labs   Lab Test 02/07/24  0730 02/06/24  0839   CR 1.06 1.07     No lab results found.  No lab results found.    Invalid input(s): \"UC\"     "

## 2024-02-08 NOTE — PLAN OF CARE
Goal Outcome Evaluation:  .Patient vital signs are at baseline: Yes  Patient able to ambulate as they were prior to admission or with assist devices provided by therapies during their stay:  Yes  Patient MUST void prior to discharge:  Yes  Patient able to tolerate oral intake:  Yes  Pain has adequate pain control using Oral analgesics:  Yes  Does patient have an identified :  Yes  Has goal D/C date and time been discussed with patient:  Yes      A/O x 4, reg diet . Independent in the room.Scheduled Acyclovir , tylenol and gabapentin  managing. CMS intact.

## 2024-02-08 NOTE — DISCHARGE INSTRUCTIONS
CALL  [facility] - infection disease social work is able to assist with resources, supports, and medications.    Leave a message on this phone and they will call you back: [phone] (The will help you get assistance with [facility] for financial assistance)    [place] Assistance insurance application appointment is Monday, 2/12 at 3:30, on the phone.

## 2024-02-08 NOTE — PLAN OF CARE
"Víctor Landin RN : Progress Note  Shift : [id number]  Category of Care: Inpatient  /75 (BP Location: Left arm, Patient Position: Sitting)   Pulse 113   Temp 98.4  F (36.9  C) (Temporal)   Resp 17   Ht 1.651 m (5' 5\")   Wt 58.3 kg (128 lb 9.6 oz)   SpO2 97%   BMI 21.40 kg/m      Shift Summary; Occurences, Discussions & Interventions of Note : Pt has disseminated herpes zoster rash on:  back of neck up to back of head, around neck near right shoulder, with some on right upper chest. Airborne and contact precautions maintained.     Assessment Findings of Note : Breathing wdl, cardiac regular, skin: listed above, else wdl. Denies pain. No visual changes.    Plan : Pt has received information and is aware of his responsibility to connect with his insurance agency. Per SW: pt does have insurance and can utilize it for medication filling. Pt will get in contact with insurance company and employer.  Pt pending completion of this task so to fill meds here and likely discharge.  Pending any additional changes from ID side of things.      Goal Outcome Evaluation:      Plan of Care Reviewed With: patient    Overall Patient Progress: improvingOverall Patient Progress: improving           "

## 2024-02-08 NOTE — PLAN OF CARE
Goal Outcome Evaluation:      Plan of Care Reviewed With: patient    Overall Patient Progress: improvingOverall Patient Progress: improving         A/O x 4, Reg diet, Independent in room, Scheduled Acyclovir given x 1 during time of care. PIV-NS @ 100ml/hr

## 2024-02-08 NOTE — PLAN OF CARE
Goal Outcome Evaluation:      Plan of Care Reviewed With: patient    Overall Patient Progress: improving    Pt A&O x4. Scheduled tylenol and gabapentin managing pain. CMS intact. Up independently in room. Voiding. Tolerating regular diet. Plan is for possible discharge to home tomorrow.

## 2024-02-08 NOTE — CONSULTS
Care Management Initial Consult    General Information  Assessment completed with: Patient, VM-chart review,    Type of CM/SW Visit: Offer D/C Planning    Primary Care Provider verified and updated as needed:     Readmission within the last 30 days: no previous admission in last 30 days      Reason for Consult: insurance concerns, medication concerns, community resources  Advance Care Planning:            Communication Assessment  Patient's communication style: spoken language (English or Bilingual)    Hearing Difficulty or Deaf: no   Wear Glasses or Blind: no    Cognitive  Cognitive/Neuro/Behavioral: WDL                      Living Environment:   People in home: friend(s)     Current living Arrangements: apartment      Able to return to prior arrangements: yes       Family/Social Support:  Care provided by: self  Provides care for:    Marital Status: Single             Description of Support System:           Current Resources:   Patient receiving home care services: No     Community Resources:    Equipment currently used at home: none  Supplies currently used at home:        Lifestyle & Psychosocial Needs:  Social Determinants of Health     Food Insecurity: Not on file   Depression: Not on file   Housing Stability: Not on file   Tobacco Use: High Risk (2/6/2024)    Patient History     Smoking Tobacco Use: Some Days     Smokeless Tobacco Use: Unknown     Passive Exposure: Not on file   Financial Resource Strain: Not on file   Alcohol Use: Not on file   Transportation Needs: Not on file   Physical Activity: Not on file   Interpersonal Safety: Not on file   Stress: Not on file   Social Connections: Not on file       Functional Status:  Prior to admission patient needed assistance:   Dependent ADLs:: Independent  Dependent IADLs:: Independent       Mental Health Status:  Mental Health Status: No Current Concerns       Chemical Dependency Status:  Chemical Dependency Status: No Current Concerns            Additional  Information:  SW consulted by ID for insurance issue, needs expensive HIV medications and support options. SW reviewed chart and spoke to pt.  Pt reports working at [employer] and believes he has insurance but doesn't have the card/info.  SW requested he call his employer to find this info out so we can get it on file and help with medications.    Sw shared a note from [facility] ID Swer from September and October 2023, that they tried to connect with him but were unsuccessful. Pt agreed to have SW call them to follow up to see if they can assist with pt's HIV care for support resources.  ANGELICA spoke to ANGELICA Molina and she stated pt would need to call and leave a VM on their SW phone and a Swer will be assigned to him. If pt is underinsured they can assist with getting him to [facility] for financial assistance.     Angelica informed pt of the above information and updated AVS with contact info for [facility] SW dept, [phone], and provided a written note with the info.    Pt denies any other needs at this time.    CARLOS Everett, [facility]  Inpatient Care Coordination  [facility]  [phone]

## 2024-02-08 NOTE — PLAN OF CARE
Goal Outcome Evaluation:      Plan of Care Reviewed With: patient    Overall Patient Progress: improving    Pt A&O x4. PO scheduled tylenol and gabapentin managing pain. Ice to affected areas. CMS intact. Up independently in room. Voiding. Tolerating regular diet.

## 2024-02-08 NOTE — PROGRESS NOTES
[facility]    Medicine Progress Note - Hospitalist Service    Date of Admission:  2/6/2024    Assessment & Plan     Sandi Thomas is a 53 year old male with past medical history including HIV diagnosed about 15 years ago for which it appears he has been lost to follow-up, not currently on any medications admitted on 2/6/2024 with an evolving blistering rash which started on his mid upper back a couple of days prior to admission which was painful and involved his neck, right shoulder/upper arm and scalp and forehead on the right.Here basic lab workup was relatively unrevealing.  He was started on treatment with IV acyclovir for suspected disseminated zoster in the setting of untreated HIV.  Infectious disease following.      Probable disseminated herpes zoster  -Started acyclovir 10 mg/kg IV every 8 hours on 2/6; per ID will likely be able to transition to PO soon  -gabapentin 100 mg 3 times a day.  -scheduled acetaminophen 975 mg 3 times a day.  -Infectious disease following    HIV, longstanding  Lost previously to follow up; per ID appears to have stopped medications in 2021. CD4 87, viral load pending.   -ID following appreciate recs  -started biktarvy; working on insurance/outpatient plan     Tobacco use disorder.  -Counseled regarding smoking cessation on 2/6/2024.  -Nicotine gum if needed.     Diet: Combination Diet Regular Diet Adult    DVT Prophylaxis: Pneumatic Compression Devices  Molina Catheter: Not present  Lines: None     Cardiac Monitoring: None  Code Status: Full Code    Disposition: Will largely depend on the length of IV therapy recommended by infectious disease.  Still has open/weeping vesicles     Diet: Combination Diet Regular Diet Adult    DVT Prophylaxis: Pneumatic Compression Devices  Molina Catheter: Not present  Lines: None     Cardiac Monitoring: None  Code Status: Full Code      Clinically Significant Risk Factors                                    Disposition Plan       Expected Discharge Date: 02/09/2024      Destination: home  Discharge Comments: Home          Vianey Green DO  Hospitalist Service  [facility]  Securely message with [employer] (more info)  Text page via [employer] Paging/Directory   ______________________________________________________________________    Interval History   NAEON; pain improving. No vision concerns or headache    Physical Exam   Vital Signs: Temp: 98.4  F (36.9  C) Temp src: Temporal BP: 111/75 Pulse: 113   Resp: 17 SpO2: 97 % O2 Device: None (Room air)    Weight: 128 lbs 9.6 oz    General: Very pleasant male , NAD  Respiratory: Normal work of breathing.    Musculoskeletal: Moving all extremities appropriately.  Neurologic: Alert.   Psychologic: Appropriate mood and affect      Medical Decision Making       40 MINUTES SPENT BY ME on the date of service doing chart review, history, exam, documentation & further activities per the note.      Data

## 2024-02-09 VITALS
SYSTOLIC BLOOD PRESSURE: 98 MMHG | TEMPERATURE: 97.4 F | RESPIRATION RATE: 16 BRPM | HEART RATE: 101 BPM | WEIGHT: 128.6 LBS | DIASTOLIC BLOOD PRESSURE: 62 MMHG | BODY MASS INDEX: 21.43 KG/M2 | OXYGEN SATURATION: 98 % | HEIGHT: 65 IN

## 2024-02-09 LAB
ANION GAP SERPL CALCULATED.3IONS-SCNC: 11 MMOL/L (ref 7–15)
BUN SERPL-MCNC: 11.4 MG/DL (ref 6–20)
CALCIUM SERPL-MCNC: 9.1 MG/DL (ref 8.6–10)
CHLORIDE SERPL-SCNC: 101 MMOL/L (ref 98–107)
CREAT SERPL-MCNC: 0.92 MG/DL (ref 0.67–1.17)
DEPRECATED HCO3 PLAS-SCNC: 22 MMOL/L (ref 22–29)
EGFRCR SERPLBLD CKD-EPI 2021: >90 ML/MIN/1.73M2
GLUCOSE SERPL-MCNC: 158 MG/DL (ref 70–99)
POTASSIUM SERPL-SCNC: 3.9 MMOL/L (ref 3.4–5.3)
SODIUM SERPL-SCNC: 134 MMOL/L (ref 135–145)

## 2024-02-09 PROCEDURE — 250N000013 HC RX MED GY IP 250 OP 250 PS 637: Performed by: INTERNAL MEDICINE

## 2024-02-09 PROCEDURE — 250N000011 HC RX IP 250 OP 636: Mod: JZ | Performed by: INTERNAL MEDICINE

## 2024-02-09 PROCEDURE — 80048 BASIC METABOLIC PNL TOTAL CA: CPT | Performed by: STUDENT IN AN ORGANIZED HEALTH CARE EDUCATION/TRAINING PROGRAM

## 2024-02-09 PROCEDURE — 36415 COLL VENOUS BLD VENIPUNCTURE: CPT | Performed by: STUDENT IN AN ORGANIZED HEALTH CARE EDUCATION/TRAINING PROGRAM

## 2024-02-09 PROCEDURE — 99232 SBSQ HOSP IP/OBS MODERATE 35: CPT | Performed by: INTERNAL MEDICINE

## 2024-02-09 PROCEDURE — 258N000003 HC RX IP 258 OP 636: Performed by: INTERNAL MEDICINE

## 2024-02-09 PROCEDURE — 250N000013 HC RX MED GY IP 250 OP 250 PS 637: Performed by: HOSPITALIST

## 2024-02-09 RX ORDER — VALACYCLOVIR HYDROCHLORIDE 1 G/1
1000 TABLET, FILM COATED ORAL 3 TIMES DAILY
Qty: 18 TABLET | Refills: 0 | Status: SHIPPED | OUTPATIENT
Start: 2024-02-09 | End: 2024-02-15

## 2024-02-09 RX ORDER — SULFAMETHOXAZOLE AND TRIMETHOPRIM 400; 80 MG/1; MG/1
1 TABLET ORAL DAILY
Qty: 30 TABLET | Refills: 0 | Status: SHIPPED | OUTPATIENT
Start: 2024-02-09

## 2024-02-09 RX ORDER — VALACYCLOVIR HYDROCHLORIDE 500 MG/1
1000 TABLET, FILM COATED ORAL EVERY 12 HOURS SCHEDULED
Status: DISCONTINUED | OUTPATIENT
Start: 2024-02-09 | End: 2024-02-09 | Stop reason: HOSPADM

## 2024-02-09 RX ADMIN — VALACYCLOVIR HYDROCHLORIDE 1000 MG: 500 TABLET, FILM COATED ORAL at 08:05

## 2024-02-09 RX ADMIN — ACETAMINOPHEN 975 MG: 325 TABLET, FILM COATED ORAL at 04:04

## 2024-02-09 RX ADMIN — NICOTINE POLACRILEX 2 MG: 2 GUM, CHEWING BUCCAL at 12:21

## 2024-02-09 RX ADMIN — BICTEGRAVIR SODIUM, EMTRICITABINE, AND TENOFOVIR ALAFENAMIDE FUMARATE 1 TABLET: 50; 200; 25 TABLET ORAL at 08:05

## 2024-02-09 RX ADMIN — ACYCLOVIR SODIUM 600 MG: 50 INJECTION, SOLUTION INTRAVENOUS at 00:42

## 2024-02-09 RX ADMIN — GABAPENTIN 100 MG: 100 CAPSULE ORAL at 08:05

## 2024-02-09 RX ADMIN — GABAPENTIN 100 MG: 100 CAPSULE ORAL at 14:48

## 2024-02-09 RX ADMIN — ACETAMINOPHEN 975 MG: 325 TABLET, FILM COATED ORAL at 12:19

## 2024-02-09 ASSESSMENT — ACTIVITIES OF DAILY LIVING (ADL)
ADLS_ACUITY_SCORE: 18

## 2024-02-09 NOTE — PLAN OF CARE
Afebrile.  Soft SBPs 90-100s, asymptomatic.  Denies pain.  No nausea.  LS clear bilat., RA.  CMS+.  Rash neck area down to R shoulder, armpit & upper arm mild itchiness, encouraged cold pack.   Up ad perla, ambulating room often.  Voiding, LBM 2/8.  Reviewed DC instructions with patient.  Pt verbalized importance of picking up DC filled meds and compliance with taking meds at home.  Patient discharged to home with discharge instructions & personal belongings.  Left floor ambulating indept'ly, transport home by friend.

## 2024-02-09 NOTE — PROGRESS NOTES
Care Management Discharge Note    Discharge Date: 02/10/2024       Discharge Disposition:      Discharge Services:      Discharge DME:      Discharge Transportation: family or friend will provide    Private pay costs discussed: Not applicable      Additional Information:  Pt informed bed side nurse he doesn't have insurance.  SW informed financial counseling.  Pt wants to discharge home today. Pt can get medications filled here and ensure to follow up with [facility] for continued care with ID and the  to assist with continued medication supply.    Bedside nurse agreed to review highlighted info in the AVS for pt to follow up.    Update: Financial counseling contacted pt and screened for medical assistance, they qualify.  Pt is scheduled an insurance application via phone for Monday, 2/12 at 3:30, on the phone. Angelica added appt time in AVS    CARLOS Everett, [facility]  Inpatient Care Coordination  [facility]  [phone]

## 2024-02-09 NOTE — PROGRESS NOTES
"[facility]  Infectious Disease Progress Note          Assessment and Plan:   Date of Admission:  2/6/2024  Date of Consult (When I saw the patient): 02/07/24        Assessment & Plan  Sandi Thomas is a 53 year old who was admitted on 2/6/2024.      Impression: 1 53-year-old male, underlying HIV, admitted with acute right chest wall and arm zoster, primarily in dermatome, no evidence of visceral infection some dissemination outside the border  2 longstanding HIV infection, last lab studies in 2018 looks like stopped medications in 2021, had some insurance issues and no follow-up with ID at [facility] since that time, T cells 87 currently viral load pending but likely to be high     REC 1 start Biktarvy, social service to see regarding the insurance issue and if has an insurance issue seek out support options for getting medications their note seen, may have insurance awaiting for that information if not insurance hopefully some emergency plan to get his meds  2 acyclovir while here but I think okay to 6 more days of oral Valtrex 1 g 3 times daily okay anytime  3 Bactrim single strength once daily prophylaxis at discharge  Call if issues this weekend              Interval History:     no new complaints and doing well; no cp, sob, n/v/d, or abd pain.  Feels okay, tolerating meds, rash about the same still some active blistering lesions              Medications:      acetaminophen  975 mg Oral Q8H    bictegravir-emtricitabine-tenofovir  1 tablet Oral Daily    gabapentin  100 mg Oral TID    sodium chloride (PF)  3 mL Intracatheter Q8H    valACYclovir  1,000 mg Oral Q12H [facility] (08/20)                  Physical Exam:   Blood pressure 109/66, pulse 102, temperature 97.4  F (36.3  C), temperature source Temporal, resp. rate 16, height 1.651 m (5' 5\"), weight 58.3 kg (128 lb 9.6 oz), SpO2 98%.  Wt Readings from Last 2 Encounters:   02/06/24 58.3 kg (128 lb 9.6 oz)   02/06/24 59 kg (130 lb)     Vital " "Signs with Ranges  Temp:  [96.9  F (36.1  C)-98  F (36.7  C)] 97.4  F (36.3  C)  Pulse:  [] 102  Resp:  [16] 16  BP: (104-132)/(66-80) 109/66  SpO2:  [98 %-99 %] 98 %    Constitutional: Awake, alert, cooperative, no apparent distress     Lungs: Clear to auscultation bilaterally, no crackles or wheezing   Cardiovascular: Regular rate and rhythm, normal S1 and S2, and no murmur noted   Abdomen: Normal bowel sounds, soft, non-distended, non-tender   Skin: No rashes, no cyanosis, no edema   Other: Rash about same          Data:   All microbiology laboratory data reviewed.  Recent Labs   Lab Test 02/07/24  0730 02/06/24  0839   WBC 5.2 5.0   HGB 12.8* 12.9*   HCT 39.5* 39.5*   MCV 91 89    232     Recent Labs   Lab Test 02/09/24  0705 02/07/24  0730 02/06/24  0839   CR 0.92 1.06 1.07     No lab results found.  No lab results found.    Invalid input(s): \"UC\"     "

## 2024-02-09 NOTE — PLAN OF CARE
Goal Outcome Evaluation:         .Patient vital signs are at baseline: Yes  Patient able to ambulate as they were prior to admission or with assist devices provided by therapies during their stay:  Yes  Patient MUST void prior to discharge:  Yes  Patient able to tolerate oral intake:  Yes  Pain has adequate pain control using Oral analgesics:  Yes  Does patient have an identified :  Yes  Has goal D/C date and time been discussed with patient:  Yes     Pt is A&O x 4, scheduled tylenol and gabapentin for pain.On acyclovir for herpes switched to oral after IV infiltrated . Independent in the room.Tolerating regular diet . Plan to discuss home.

## 2024-02-13 NOTE — DISCHARGE SUMMARY
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              Discharge Disposition   Discharged to home  Condition at discharge: Stable    Hospital Course   Sandi Thomas is a 53 year old male with past medical history including HIV diagnosed about 15 years ago for which it appears he has been lost to follow-up, not currently on any medications admitted on 2/6/2024 with an evolving blistering rash which started on his mid upper back a couple of days prior to admission which was painful and involved his neck, right shoulder/upper arm and scalp and forehead on the right.Here basic lab workup was relatively unrevealing.  He was started on treatment with IV acyclovir for suspected disseminated zoster in the setting of untreated HIV. Discharged on biktarvy and remainder of PO acyclovir course.    Consultations This Hospital Stay   INFECTIOUS DISEASES IP CONSULT  CARE MANAGEMENT / SOCIAL WORK IP CONSULT    Code Status   Prior    Time Spent on this Encounter   I, Vianey Green DO, discharged this patient today but I did not personally see the patient today and will not be billing for the patient's discharge.       DO [facility]  [address]  [facility] [address]  Phone: [phone]  Fax: [phone]  ______________________________________________________________________    Physical Exam   Vital Signs:                    Weight: 128 lbs 9.6 oz         Primary Care  Physician   Physician No Ref-Primary    Discharge Orders      Reason for your hospital stay    You were hospitalized for zoster infection. I sent a prescription for acyclovir remainder of course as well as 30 day supply of biktarvy (antiretroviral for HIV) and bactrim (prophylaxis for infections)     Follow-up and recommended labs and tests     Follow up with primary care provider, Physician No Ref-Primary, within 7 days for hospital follow- up.     Activity    Your activity upon discharge: activity as tolerated     Diet    Follow this diet upon discharge: Orders Placed This Encounter      Combination Diet Regular Diet Adult       Significant Results and Procedures   Most Recent 3 CBC's:  Recent Labs   Lab Test 02/07/24  0730 02/06/24  0839   WBC 5.2 5.0   HGB 12.8* 12.9*   MCV 91 89    232     Most Recent 3 BMP's:  Recent Labs   Lab Test 02/09/24  0705 02/07/24  0730 02/06/24  0839   * 136 139   POTASSIUM 3.9 4.0 4.6   CHLORIDE 101 104 105   CO2 22 25 23   BUN 11.4 13.7 16.8   CR 0.92 1.06 1.07   ANIONGAP 11 7 11   HUGO 9.1 8.8 9.0   * 83 98   , No results found for this or any previous visit.    Discharge Medications   Discharge Medication List as of 2/9/2024  3:21 PM        START taking these medications    Details   bictegravir-emtricitabine-tenofovir (BIKTARVY) -25 MG per tablet Take 1 tablet by mouth daily, Disp-30 tablet, R-0, E-Prescribe      sulfamethoxazole-trimethoprim (BACTRIM) 400-80 MG tablet Take 1 tablet by mouth daily, Disp-30 tablet, R-0, E-Prescribe      valACYclovir (VALTREX) 1000 mg tablet Take 1 tablet (1,000 mg) by mouth 3 times daily for 6 days, Disp-18 tablet, R-0, E-Prescribe           Allergies   No Known Allergies